# Patient Record
Sex: MALE | Race: BLACK OR AFRICAN AMERICAN
[De-identification: names, ages, dates, MRNs, and addresses within clinical notes are randomized per-mention and may not be internally consistent; named-entity substitution may affect disease eponyms.]

---

## 2017-12-21 ENCOUNTER — HOSPITAL ENCOUNTER (EMERGENCY)
Dept: HOSPITAL 53 - M ED | Age: 22
Discharge: HOME | End: 2017-12-21
Payer: OTHER GOVERNMENT

## 2017-12-21 VITALS — SYSTOLIC BLOOD PRESSURE: 139 MMHG | DIASTOLIC BLOOD PRESSURE: 60 MMHG

## 2017-12-21 VITALS — WEIGHT: 175.38 LBS | BODY MASS INDEX: 27.53 KG/M2 | HEIGHT: 67 IN

## 2017-12-21 DIAGNOSIS — G43.909: Primary | ICD-10-CM

## 2017-12-21 PROCEDURE — 99283 EMERGENCY DEPT VISIT LOW MDM: CPT

## 2017-12-21 PROCEDURE — 70450 CT HEAD/BRAIN W/O DYE: CPT

## 2017-12-21 PROCEDURE — 96372 THER/PROPH/DIAG INJ SC/IM: CPT

## 2017-12-21 NOTE — REPUSA
CLINICAL HISTORY: Headaches.

TECHNIQUE: Multiple axial brain CT scan sections were obtained from base to vertex without contrast a
dministration.

COMMENTS:

The study shows normal configuration of sella turcica. There are no intra or extra-axial collections.
 There is no mass effect or midline shift. There is no evidence of hematoma formation. No hydrocephal
us is present. No abnormal calcifications are noted.

No significant abnormalities are seen either in the posterior fossa or supratentorial compartment.

The sinuses and mastoid air cells are patent.

IMPRESSION:

No evidence of acute intracranial pathology. 

Thank you for your kind referral of this patient.

     Electronically signed by PRUDENCE TOPETE MD on 12/21/2017 07:29:43 AM ET

## 2019-11-26 ENCOUNTER — HOSPITAL ENCOUNTER (EMERGENCY)
Dept: HOSPITAL 53 - M ED | Age: 24
Discharge: HOME | End: 2019-11-26
Payer: SELF-PAY

## 2019-11-26 VITALS — DIASTOLIC BLOOD PRESSURE: 73 MMHG | SYSTOLIC BLOOD PRESSURE: 144 MMHG

## 2019-11-26 VITALS — BODY MASS INDEX: 28.1 KG/M2 | HEIGHT: 68 IN | WEIGHT: 185.39 LBS

## 2019-11-26 DIAGNOSIS — F43.21: Primary | ICD-10-CM

## 2019-11-26 DIAGNOSIS — Z63.0: ICD-10-CM

## 2019-11-26 DIAGNOSIS — R00.1: ICD-10-CM

## 2019-11-26 DIAGNOSIS — F17.200: ICD-10-CM

## 2019-11-26 LAB
ALBUMIN SERPL BCG-MCNC: 4.6 GM/DL (ref 3.2–5.2)
ALT SERPL W P-5'-P-CCNC: 20 U/L (ref 12–78)
AMPHETAMINES UR QL SCN: NEGATIVE
APAP SERPL-MCNC: < 2 UG/ML (ref 10–30)
BARBITURATES UR QL SCN: NEGATIVE
BENZODIAZ UR QL SCN: NEGATIVE
BILIRUB CONJ SERPL-MCNC: 0.1 MG/DL (ref 0–0.2)
BILIRUB SERPL-MCNC: 0.6 MG/DL (ref 0.2–1)
BUN SERPL-MCNC: 13 MG/DL (ref 7–18)
BZE UR QL SCN: NEGATIVE
CALCIUM SERPL-MCNC: 9 MG/DL (ref 8.5–10.1)
CANNABINOIDS UR QL SCN: NEGATIVE
CHLORIDE SERPL-SCNC: 107 MEQ/L (ref 98–107)
CO2 SERPL-SCNC: 26 MEQ/L (ref 21–32)
CREAT SERPL-MCNC: 0.9 MG/DL (ref 0.7–1.3)
ETHANOL SERPL-MCNC: < 0.003 % (ref 0–0.01)
GFR SERPL CREATININE-BSD FRML MDRD: > 60 ML/MIN/{1.73_M2} (ref 60–?)
GLUCOSE SERPL-MCNC: 76 MG/DL (ref 70–100)
HCT VFR BLD AUTO: 42.1 % (ref 42–52)
HGB BLD-MCNC: 13.7 G/DL (ref 13.5–17.5)
MCH RBC QN AUTO: 28.9 PG (ref 27–33)
MCHC RBC AUTO-ENTMCNC: 32.5 G/DL (ref 32–36.5)
MCV RBC AUTO: 88.8 FL (ref 80–96)
METHADONE UR QL SCN: NEGATIVE
OPIATES UR QL SCN: NEGATIVE
PCP UR QL SCN: NEGATIVE
PLATELET # BLD AUTO: 221 10^3/UL (ref 150–450)
POTASSIUM SERPL-SCNC: 4 MEQ/L (ref 3.5–5.1)
PROT SERPL-MCNC: 8.1 GM/DL (ref 6.4–8.2)
RBC # BLD AUTO: 4.74 10^6/UL (ref 4.3–6.1)
SALICYLATES SERPL-MCNC: < 1.7 MG/DL (ref 5–30)
SODIUM SERPL-SCNC: 141 MEQ/L (ref 136–145)
TSH SERPL DL<=0.005 MIU/L-ACNC: 2.75 UIU/ML (ref 0.36–3.74)
WBC # BLD AUTO: 8.4 10^3/UL (ref 4–10)

## 2019-11-26 PROCEDURE — 93005 ELECTROCARDIOGRAM TRACING: CPT

## 2019-11-26 PROCEDURE — 80076 HEPATIC FUNCTION PANEL: CPT

## 2019-11-26 PROCEDURE — 80048 BASIC METABOLIC PNL TOTAL CA: CPT

## 2019-11-26 PROCEDURE — 84443 ASSAY THYROID STIM HORMONE: CPT

## 2019-11-26 PROCEDURE — 99284 EMERGENCY DEPT VISIT MOD MDM: CPT

## 2019-11-26 PROCEDURE — 36415 COLL VENOUS BLD VENIPUNCTURE: CPT

## 2019-11-26 PROCEDURE — 80307 DRUG TEST PRSMV CHEM ANLYZR: CPT

## 2019-11-26 PROCEDURE — 85027 COMPLETE CBC AUTOMATED: CPT

## 2019-11-26 SDOH — SOCIAL STABILITY - SOCIAL INSECURITY: PROBLEMS IN RELATIONSHIP WITH SPOUSE OR PARTNER: Z63.0

## 2019-11-27 NOTE — ECGEPIP
[facility] - ED

                                       

                                       Test Date:    2019

Pat Name:     BREANNA WONG           Department:   

Patient ID:   [id number]                 Room:         -

Gender:       Male                     Technician:   SB

:          1995               Requested By: MADDIE KOLB

Order Number: [id number]     Reading MD:   Rashawn Philip

                                 Measurements

Intervals                              Axis          

Rate:         59                       P:            -4

FL:           160                      QRS:          -15

QRSD:         94                       T:            17

QT:           388                                    

QTc:          385                                    

                           Interpretive Statements

SINUS BRADYCARDIA

MINIMAL VOLTAGE CRITERIA FOR LVH, CONSIDER NORMAL VARIANT

BENIGN EARLY REPOLARIZATION

NONSPECIFIC T WAVE ABNORMALITIES

NO PRIORS FOR COMPARISON

Electronically Signed on 2019 8:41:08 EST by Rashawn Philip